# Patient Record
Sex: FEMALE | Race: WHITE | NOT HISPANIC OR LATINO | Employment: FULL TIME | URBAN - METROPOLITAN AREA
[De-identification: names, ages, dates, MRNs, and addresses within clinical notes are randomized per-mention and may not be internally consistent; named-entity substitution may affect disease eponyms.]

---

## 2018-08-15 ENCOUNTER — TRANSCRIBE ORDERS (OUTPATIENT)
Dept: ADMINISTRATIVE | Facility: HOSPITAL | Age: 38
End: 2018-08-15

## 2018-08-15 ENCOUNTER — APPOINTMENT (OUTPATIENT)
Dept: RADIOLOGY | Facility: MEDICAL CENTER | Age: 38
End: 2018-08-15
Payer: COMMERCIAL

## 2018-08-15 DIAGNOSIS — M54.6 THORACIC SPINE PAIN: ICD-10-CM

## 2018-08-15 DIAGNOSIS — M54.6 THORACIC SPINE PAIN: Primary | ICD-10-CM

## 2018-08-15 PROCEDURE — 72072 X-RAY EXAM THORAC SPINE 3VWS: CPT

## 2021-05-14 ENCOUNTER — TRANSCRIBE ORDERS (OUTPATIENT)
Dept: ADMINISTRATIVE | Facility: HOSPITAL | Age: 41
End: 2021-05-14

## 2021-05-14 ENCOUNTER — APPOINTMENT (OUTPATIENT)
Dept: RADIOLOGY | Facility: MEDICAL CENTER | Age: 41
End: 2021-05-14
Payer: COMMERCIAL

## 2021-05-14 DIAGNOSIS — M54.50 LOW BACK PAIN, UNSPECIFIED BACK PAIN LATERALITY, UNSPECIFIED CHRONICITY, UNSPECIFIED WHETHER SCIATICA PRESENT: Primary | ICD-10-CM

## 2021-05-14 DIAGNOSIS — M54.50 LOW BACK PAIN, UNSPECIFIED BACK PAIN LATERALITY, UNSPECIFIED CHRONICITY, UNSPECIFIED WHETHER SCIATICA PRESENT: ICD-10-CM

## 2021-05-14 PROCEDURE — 72110 X-RAY EXAM L-2 SPINE 4/>VWS: CPT

## 2021-12-07 ENCOUNTER — HOSPITAL ENCOUNTER (OUTPATIENT)
Dept: ULTRASOUND IMAGING | Facility: CLINIC | Age: 41
Discharge: HOME | End: 2021-12-07
Payer: COMMERCIAL

## 2021-12-07 ENCOUNTER — HOSPITAL ENCOUNTER (OUTPATIENT)
Dept: MAMMOGRAPHY | Facility: CLINIC | Age: 41
Discharge: HOME | End: 2021-12-07
Payer: COMMERCIAL

## 2021-12-07 VITALS — BODY MASS INDEX: 16.48 KG/M2 | HEIGHT: 67 IN | WEIGHT: 105 LBS

## 2021-12-07 DIAGNOSIS — N63.10 UNSPECIFIED LUMP IN THE RIGHT BREAST, UNSPECIFIED QUADRANT: ICD-10-CM

## 2021-12-07 DIAGNOSIS — N64.4 BREAST PAIN: ICD-10-CM

## 2021-12-07 PROCEDURE — 76642 ULTRASOUND BREAST LIMITED: CPT

## 2021-12-07 PROCEDURE — 77066 DX MAMMO INCL CAD BI: CPT

## 2021-12-07 PROCEDURE — G0279 TOMOSYNTHESIS, MAMMO: HCPCS

## 2022-12-31 ENCOUNTER — HOSPITAL ENCOUNTER (EMERGENCY)
Facility: HOSPITAL | Age: 42
Discharge: HOME | End: 2023-01-01
Attending: EMERGENCY MEDICINE

## 2022-12-31 DIAGNOSIS — F32.A DEPRESSION: ICD-10-CM

## 2022-12-31 DIAGNOSIS — Z00.8 ENCOUNTER FOR PSYCHOLOGICAL EVALUATION: Primary | ICD-10-CM

## 2022-12-31 DIAGNOSIS — F30.9 MANIA (HCC): ICD-10-CM

## 2022-12-31 LAB
AMPHETAMINES SERPL QL SCN: NEGATIVE
BARBITURATES UR QL: NEGATIVE
BENZODIAZ UR QL: NEGATIVE
COCAINE UR QL: POSITIVE
ETHANOL EXG-MCNC: 0 MG/DL
EXT PREGNANCY TEST URINE: NEGATIVE
EXT. CONTROL: NORMAL
FLUAV RNA RESP QL NAA+PROBE: NEGATIVE
FLUBV RNA RESP QL NAA+PROBE: NEGATIVE
METHADONE UR QL: NEGATIVE
OPIATES UR QL SCN: NEGATIVE
OXYCODONE+OXYMORPHONE UR QL SCN: NEGATIVE
PCP UR QL: NEGATIVE
RSV RNA RESP QL NAA+PROBE: NEGATIVE
SARS-COV-2 RNA RESP QL NAA+PROBE: NEGATIVE
THC UR QL: NEGATIVE

## 2022-12-31 RX ORDER — IBUPROFEN 400 MG/1
400 TABLET ORAL EVERY 8 HOURS PRN
Status: DISCONTINUED | OUTPATIENT
Start: 2022-12-31 | End: 2023-01-01 | Stop reason: HOSPADM

## 2022-12-31 RX ORDER — HYDROXYZINE HYDROCHLORIDE 25 MG/1
25 TABLET, FILM COATED ORAL EVERY 6 HOURS PRN
Status: DISCONTINUED | OUTPATIENT
Start: 2022-12-31 | End: 2023-01-01 | Stop reason: HOSPADM

## 2022-12-31 RX ORDER — ACETAMINOPHEN 325 MG/1
650 TABLET ORAL EVERY 8 HOURS PRN
Status: DISCONTINUED | OUTPATIENT
Start: 2022-12-31 | End: 2023-01-01 | Stop reason: HOSPADM

## 2022-12-31 RX ORDER — DIPHENHYDRAMINE HCL 25 MG
25 TABLET ORAL EVERY 6 HOURS PRN
Status: DISCONTINUED | OUTPATIENT
Start: 2022-12-31 | End: 2023-01-01 | Stop reason: HOSPADM

## 2022-12-31 RX ORDER — LANOLIN ALCOHOL/MO/W.PET/CERES
3 CREAM (GRAM) TOPICAL
Status: DISCONTINUED | OUTPATIENT
Start: 2022-12-31 | End: 2023-01-01 | Stop reason: HOSPADM

## 2022-12-31 RX ORDER — MAGNESIUM HYDROXIDE/ALUMINUM HYDROXICE/SIMETHICONE 120; 1200; 1200 MG/30ML; MG/30ML; MG/30ML
30 SUSPENSION ORAL EVERY 4 HOURS PRN
Status: DISCONTINUED | OUTPATIENT
Start: 2022-12-31 | End: 2023-01-01 | Stop reason: HOSPADM

## 2022-12-31 RX ORDER — NICOTINE 21 MG/24HR
21 PATCH, TRANSDERMAL 24 HOURS TRANSDERMAL DAILY
Status: DISCONTINUED | OUTPATIENT
Start: 2023-01-01 | End: 2023-01-01 | Stop reason: HOSPADM

## 2023-01-01 VITALS
TEMPERATURE: 98.2 F | RESPIRATION RATE: 18 BRPM | DIASTOLIC BLOOD PRESSURE: 81 MMHG | HEART RATE: 80 BPM | OXYGEN SATURATION: 99 % | SYSTOLIC BLOOD PRESSURE: 115 MMHG

## 2023-01-01 RX ORDER — IBUPROFEN 400 MG/1
400 TABLET ORAL ONCE
Status: DISCONTINUED | OUTPATIENT
Start: 2023-01-01 | End: 2023-01-01

## 2023-01-01 RX ORDER — BUPRENORPHINE AND NALOXONE 8; 2 MG/1; MG/1
8 FILM, SOLUBLE BUCCAL; SUBLINGUAL ONCE
Status: COMPLETED | OUTPATIENT
Start: 2023-01-01 | End: 2023-01-01

## 2023-01-01 RX ADMIN — Medication 21 MG: at 07:21

## 2023-01-01 RX ADMIN — BUPRENORPHINE AND NALOXONE 8 MG: 8; 2 FILM BUCCAL; SUBLINGUAL at 11:08

## 2023-01-01 RX ADMIN — IBUPROFEN 400 MG: 400 TABLET ORAL at 08:32

## 2023-01-01 RX ADMIN — HYDROXYZINE HYDROCHLORIDE 25 MG: 25 TABLET ORAL at 09:14

## 2023-01-01 RX ADMIN — ACETAMINOPHEN 650 MG: 325 TABLET, FILM COATED ORAL at 05:30
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
## 2023-01-01 NOTE — ED NOTES
Patient approached nursing station to ask how long it would be before she could sign out  Explained to patient that the provider had explained to her that the [facility] crisis 302 would be upheld at this time  Patient is asking to speak with someone because, "I don't want to hurt myself and I don't need to be here  I need to be at home and getting a job   "  Patient advised that the ED crisis worker would be over to speak with her as soon as all her lab tests resulted  Patient walked back to room stating, " This is just making me upset"       Jackson Jimenez, ELENA  12/31/22 2100

## 2023-01-01 NOTE — ED NOTES
Assumed care of pt at this time, pt ambulatory to bathroom with even and steady gait  Pt in no apparent distress        Shai Amado RN  01/01/23 2065

## 2023-01-01 NOTE — ED NOTES
Psych consult completed  Pt given phone to call significant other        Carolyn Maki RN  01/01/23 2005

## 2023-01-01 NOTE — ED NOTES
Pt reporting that she feels anxious, offered atarax and pt accepts  Pt stating that she wishes to go home and never was sincere that she wanted to harm herself  Crisis to speak with pt        Kathy Mccarthy RN  01/01/23 1551
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
## 2023-01-01 NOTE — ED NOTES
This writer discussed the patients current presentation and recommended discharge plan with Dr Smith Herrera psychiatrist recommends discharge for pt  They agree with the patient being discharged at this time with referrals and/or information about drug and outpatient therapy resources  The patient was Instructed to follow up with outpatient resources  The patient was provided with referral information for:   Drug counseling, therapist and outpatient  This writer and the patient completed a safety plan  The patient was provided with a copy of their safety plan with encouragement to utilize the plan following discharge  In addition, the patient was instructed to call [facility] crisis, other crisis services, [facility] or to go to the nearest ER immediately if their situation changes at any time  This writer discussed discharge plans with the patient, who agrees with and understands the discharge plans           SAFETY PLAN  Warning Signs (thoughts, images, mood, behavior, situations) of a potential crisis: increase suicidal thoughts, depression, anxiety    Coping Skills (what can I do to take my mind off the problem, or to keep myself safe): Return to ED, call sister    Outside Support (who can I reach out to for support and help): ER or hotline        [place] Suicide Prevention Hotline:  [address] [phone] - [address] [address]: [facility]: [address] [facility] [address] [place] [address] [address] [phone] - Crisis   [phone] - Peer Support Talk Line (1-9pm daily)  [phone] - [address] (1-9pm daily)  [address] [facility] [phone] - [address] [facility]
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
## 2023-01-01 NOTE — ED NOTES
Pt signed [phone] for inpatient mental health treatment  Original 201 on the paper chart at nurse's desk  Pt currently has no active insurance, [place] eligible  Pt will most likely need a  network bed  Currently no beds available in the  network

## 2023-01-01 NOTE — ED NOTES
Assumed care of patient at this time, Q15 minute checks in place  Patient is sleeping on the stretcher, respirations equal and unlabored, no outward signs of distress at this time        Gala Shore RN  01/01/23 0127

## 2023-01-01 NOTE — ED NOTES
302 not upheld due to patient signing  determination faxed to [facility] [place]

## 2023-01-01 NOTE — ED NOTES
Patient given gingerale at this time, but she declined any food        Domenic Richey RN  12/31/22 2114       Domenic Richey RN  12/31/22 2114

## 2023-01-01 NOTE — ED NOTES
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

## 2023-01-01 NOTE — ED NOTES
Crisis met with Pt per her request to update on placement  In addition, pt states she is in pain and in need of her suboxone  Which she has at home  Pt also requested phone call and is requesting to go home as she does not feel suicidal and has no intent to commit suicide

## 2023-01-01 NOTE — ED CARE HANDOFF
Emergency Department Sign Out Note        Sign out and transfer of care from [facility]  See Separate Emergency Department note  The patient, Jensen Call, was evaluated by the previous provider for psychiatric evaluation and a statement that she was going to kill her self with a razor blade       Workup Completed:  Crisis and labs    ED Course / Workup Pending (followup): I reviewed the PDMP and the patient does take Suboxone  It was listed for 3 times daily however she states she has not been taking it that way and may only take it once per day so I did give her a dose today as she is feeling shaky  The patient does not want to be here and does not feel she needs to be hospitalized  She did sign a 201 and I explained to her that we will need to get a psychiatric consult in order to evaluate her to see if inpatient hospitalization is necessary  Patient is agreeable to this  She is more calm and cooperative here  She was able to answer my questions appropriately  Denies SI or HI        11:45 AM: Discussed with psychiatrist on-call and he feels that she can be discharged and is not a threat to herself or anybody else at the present time  This was discussed with crisis  Will discharge  Procedures  Medical Decision Making  Depression: chronic illness or injury  Encounter for psychological evaluation: acute illness or injury  Amount and/or Complexity of Data Reviewed  Discussion of management or test interpretation with external provider(s): Discussion with crisis and psychiatrist on-call              Disposition  Final diagnoses:   Encounter for psychological evaluation   Depression   [facility])     Time reflects when diagnosis was documented in both MDM as applicable and the Disposition within this note     Time User Action Codes Description Comment    12/31/2022  9:48 PM Brandy Kitchen Add [Z00 8] Encounter for psychological evaluation     12/31/2022  9:48 PM Imtiaz Jennifer Juan Add [T08  A] Depression     12/31/2022  9:48 PM Imtiaz Jennifer Juan Add [F30 9] [facility])       ED Disposition     ED Disposition   Discharge    Condition   Stable    Date/Time   Sun Jan 1, 2023 11:46 AM    Comment   Sunshine Mera should be transferred out to D and has been medically cleared  MD Kimi Trammell   Sending MD Dr Rishi Saenz, DO      Follow-up Information     Follow up With Specialties Details Why Contact Info        Follow-up with your therapist/psychiatrist/discharge instructions per crisis        Patient's Medications    No medications on file     No discharge procedures on file         ED Provider  Electronically Signed by     Nilson Nolen MD  01/01/23 [address] [place] [address] [place], MD  01/01/23 1141

## 2023-01-01 NOTE — DISCHARGE INSTRUCTIONS
The patient was Instructed to follow up with outpatient resources  The patient was provided with referral information for:   Drug counseling, therapist and outpatient  This writer and the patient completed a safety plan  The patient was provided with a copy of their safety plan with encouragement to utilize the plan following discharge  In addition, the patient was instructed to call local [facility] crisis, other crisis services, [facility] or to go to the nearest ER immediately if their situation changes at any time  This writer discussed discharge plans with the patient, who agrees with and understands the discharge plans           SAFETY PLAN  Warning Signs (thoughts, images, mood, behavior, situations) of a potential crisis: increase suicidal thoughts, depression, anxiety    Coping Skills (what can I do to take my mind off the problem, or to keep myself safe): Return to ED, call sister    Outside Support (who can I reach out to for support and help): ER or hotline        National Suicide Prevention Hotline:  [address] 7-645-794-333-388-8664 - [facility] [place] [facility]: Critical access hospital: [place] [address] [phone] - Crisis   [phone] - Peer Support Talk Line (1-9pm daily)  [phone] - Teen Support Talk Line (1-9pm daily)  [address] [facility]) [phone] - [facility]

## 2023-01-01 NOTE — ED NOTES
Crisis spoke with Tania from [facility] [place] crisis and updated on Pt's disposition  Tania requested that original 302 be placed at ER registration desk for [place]  302 in sealed brown envelope @ registration for pickup by Tania

## 2023-01-01 NOTE — ED NOTES
Vital signs deferred at this time, patient is sleeping on the stretcher, respirations equal and unlabored        Yoandy Ruiz RN  01/01/23 7399

## 2023-06-08 ENCOUNTER — APPOINTMENT (EMERGENCY)
Dept: RADIOLOGY | Facility: HOSPITAL | Age: 43
End: 2023-06-08

## 2023-06-08 ENCOUNTER — APPOINTMENT (EMERGENCY)
Dept: CT IMAGING | Facility: HOSPITAL | Age: 43
End: 2023-06-08

## 2023-06-08 ENCOUNTER — HOSPITAL ENCOUNTER (EMERGENCY)
Facility: HOSPITAL | Age: 43
Discharge: HOME | End: 2023-06-08
Attending: EMERGENCY MEDICINE | Admitting: EMERGENCY MEDICINE

## 2023-06-08 VITALS
OXYGEN SATURATION: 100 % | DIASTOLIC BLOOD PRESSURE: 74 MMHG | WEIGHT: 114.42 LBS | RESPIRATION RATE: 20 BRPM | BODY MASS INDEX: 17.92 KG/M2 | SYSTOLIC BLOOD PRESSURE: 121 MMHG | HEART RATE: 74 BPM

## 2023-06-08 DIAGNOSIS — R07.9 CHEST PAIN: Primary | ICD-10-CM

## 2023-06-08 DIAGNOSIS — F41.9 ANXIETY: ICD-10-CM

## 2023-06-08 DIAGNOSIS — F99 PSYCHIATRIC SYMPTOMS: ICD-10-CM

## 2023-06-08 DIAGNOSIS — R44.0 AUDITORY HALLUCINATION: ICD-10-CM

## 2023-06-08 LAB
ALBUMIN SERPL BCP-MCNC: 4.1 G/DL (ref 3.5–5)
ALP SERPL-CCNC: 54 U/L (ref 34–104)
ALT SERPL W P-5'-P-CCNC: 10 U/L (ref 7–52)
AMPHETAMINES SERPL QL SCN: POSITIVE
ANION GAP SERPL CALCULATED.3IONS-SCNC: 5 MMOL/L (ref 4–13)
AST SERPL W P-5'-P-CCNC: 16 U/L (ref 13–39)
ATRIAL RATE: 70 BPM
BARBITURATES UR QL: NEGATIVE
BASOPHILS # BLD AUTO: 0.05 THOUSANDS/ÂΜL (ref 0–0.1)
BASOPHILS NFR BLD AUTO: 1 % (ref 0–1)
BENZODIAZ UR QL: NEGATIVE
BILIRUB SERPL-MCNC: 0.16 MG/DL (ref 0.2–1)
BILIRUB UR QL STRIP: NEGATIVE
BUN SERPL-MCNC: 16 MG/DL (ref 5–25)
CALCIUM SERPL-MCNC: 8.7 MG/DL (ref 8.4–10.2)
CARDIAC TROPONIN I PNL SERPL HS: 2 NG/L
CHLORIDE SERPL-SCNC: 105 MMOL/L (ref 96–108)
CLARITY UR: CLEAR
CO2 SERPL-SCNC: 30 MMOL/L (ref 21–32)
COCAINE UR QL: NEGATIVE
COLOR UR: NORMAL
CREAT SERPL-MCNC: 0.58 MG/DL (ref 0.6–1.3)
EOSINOPHIL # BLD AUTO: 0.36 THOUSAND/ÂΜL (ref 0–0.61)
EOSINOPHIL NFR BLD AUTO: 8 % (ref 0–6)
ERYTHROCYTE [DISTWIDTH] IN BLOOD BY AUTOMATED COUNT: 11.9 % (ref 11.6–15.1)
EXT PREGNANCY TEST URINE: NEGATIVE
EXT. CONTROL: NORMAL
GFR SERPL CREATININE-BSD FRML MDRD: 112 ML/MIN/1.73SQ M
GLUCOSE SERPL-MCNC: 103 MG/DL (ref 65–140)
GLUCOSE UR STRIP-MCNC: NEGATIVE MG/DL
HCT VFR BLD AUTO: 37.9 % (ref 34.8–46.1)
HGB BLD-MCNC: 12 G/DL (ref 11.5–15.4)
HGB UR QL STRIP.AUTO: NEGATIVE
IMM GRANULOCYTES # BLD AUTO: 0.02 THOUSAND/UL (ref 0–0.2)
IMM GRANULOCYTES NFR BLD AUTO: 0 % (ref 0–2)
KETONES UR STRIP-MCNC: NEGATIVE MG/DL
LEUKOCYTE ESTERASE UR QL STRIP: NEGATIVE
LIPASE SERPL-CCNC: 9 U/L (ref 11–82)
LYMPHOCYTES # BLD AUTO: 1.97 THOUSANDS/ÂΜL (ref 0.6–4.47)
LYMPHOCYTES NFR BLD AUTO: 41 % (ref 14–44)
MAGNESIUM SERPL-MCNC: 2 MG/DL (ref 1.9–2.7)
MCH RBC QN AUTO: 30.2 PG (ref 26.8–34.3)
MCHC RBC AUTO-ENTMCNC: 31.7 G/DL (ref 31.4–37.4)
MCV RBC AUTO: 96 FL (ref 82–98)
METHADONE UR QL: NEGATIVE
MONOCYTES # BLD AUTO: 0.28 THOUSAND/ÂΜL (ref 0.17–1.22)
MONOCYTES NFR BLD AUTO: 6 % (ref 4–12)
NEUTROPHILS # BLD AUTO: 2.14 THOUSANDS/ÂΜL (ref 1.85–7.62)
NEUTS SEG NFR BLD AUTO: 44 % (ref 43–75)
NITRITE UR QL STRIP: NEGATIVE
NRBC BLD AUTO-RTO: 0 /100 WBCS
OPIATES UR QL SCN: NEGATIVE
OXYCODONE+OXYMORPHONE UR QL SCN: NEGATIVE
P AXIS: 52 DEGREES
PCP UR QL: NEGATIVE
PH UR STRIP.AUTO: 6.5 [PH]
PLATELET # BLD AUTO: 194 THOUSANDS/UL (ref 149–390)
PMV BLD AUTO: 11.6 FL (ref 8.9–12.7)
POTASSIUM SERPL-SCNC: 3.8 MMOL/L (ref 3.5–5.3)
PR INTERVAL: 132 MS
PROT SERPL-MCNC: 6.4 G/DL (ref 6.4–8.4)
PROT UR STRIP-MCNC: NEGATIVE MG/DL
QRS AXIS: 73 DEGREES
QRSD INTERVAL: 92 MS
QT INTERVAL: 400 MS
QTC INTERVAL: 432 MS
RBC # BLD AUTO: 3.97 MILLION/UL (ref 3.81–5.12)
SODIUM SERPL-SCNC: 140 MMOL/L (ref 135–147)
SP GR UR STRIP.AUTO: 1.02 (ref 1–1.03)
T WAVE AXIS: 45 DEGREES
THC UR QL: NEGATIVE
TSH SERPL DL<=0.05 MIU/L-ACNC: 2.87 UIU/ML (ref 0.45–4.5)
UROBILINOGEN UR STRIP-ACNC: <2 MG/DL
VENTRICULAR RATE: 70 BPM
WBC # BLD AUTO: 4.82 THOUSAND/UL (ref 4.31–10.16)

## 2023-06-08 PROCEDURE — 80053 COMPREHEN METABOLIC PANEL: CPT | Performed by: EMERGENCY MEDICINE

## 2023-06-08 PROCEDURE — 83735 ASSAY OF MAGNESIUM: CPT | Performed by: EMERGENCY MEDICINE

## 2023-06-08 PROCEDURE — 93010 ELECTROCARDIOGRAM REPORT: CPT

## 2023-06-08 PROCEDURE — 99285 EMERGENCY DEPT VISIT HI MDM: CPT

## 2023-06-08 PROCEDURE — 84484 ASSAY OF TROPONIN QUANT: CPT | Performed by: EMERGENCY MEDICINE

## 2023-06-08 PROCEDURE — 36415 COLL VENOUS BLD VENIPUNCTURE: CPT | Performed by: EMERGENCY MEDICINE

## 2023-06-08 PROCEDURE — 81003 URINALYSIS AUTO W/O SCOPE: CPT | Performed by: EMERGENCY MEDICINE

## 2023-06-08 PROCEDURE — 81025 URINE PREGNANCY TEST: CPT | Performed by: EMERGENCY MEDICINE

## 2023-06-08 PROCEDURE — 96365 THER/PROPH/DIAG IV INF INIT: CPT

## 2023-06-08 PROCEDURE — 83690 ASSAY OF LIPASE: CPT | Performed by: EMERGENCY MEDICINE

## 2023-06-08 PROCEDURE — 84443 ASSAY THYROID STIM HORMONE: CPT | Performed by: EMERGENCY MEDICINE

## 2023-06-08 PROCEDURE — G1004 CDSM NDSC: HCPCS

## 2023-06-08 PROCEDURE — 96366 THER/PROPH/DIAG IV INF ADDON: CPT

## 2023-06-08 PROCEDURE — 70450 CT HEAD/BRAIN W/O DYE: CPT

## 2023-06-08 PROCEDURE — 85025 COMPLETE CBC W/AUTO DIFF WBC: CPT | Performed by: EMERGENCY MEDICINE

## 2023-06-08 PROCEDURE — 80307 DRUG TEST PRSMV CHEM ANLYZR: CPT | Performed by: EMERGENCY MEDICINE

## 2023-06-08 PROCEDURE — 93005 ELECTROCARDIOGRAM TRACING: CPT

## 2023-06-08 PROCEDURE — 71046 X-RAY EXAM CHEST 2 VIEWS: CPT

## 2023-06-08 RX ORDER — HYDROXYZINE HYDROCHLORIDE 25 MG/1
25 TABLET, FILM COATED ORAL EVERY 6 HOURS PRN
Qty: 12 TABLET | Refills: 0 | Status: SHIPPED | OUTPATIENT
Start: 2023-06-08 | End: 2023-06-11

## 2023-06-08 RX ORDER — HYDROXYZINE HYDROCHLORIDE 25 MG/1
25 TABLET, FILM COATED ORAL ONCE
Status: COMPLETED | OUTPATIENT
Start: 2023-06-08 | End: 2023-06-08

## 2023-06-08 RX ADMIN — SODIUM CHLORIDE, SODIUM LACTATE, POTASSIUM CHLORIDE, AND CALCIUM CHLORIDE 1000 ML: .6; .31; .03; .02 INJECTION, SOLUTION INTRAVENOUS at 16:37

## 2023-06-08 RX ADMIN — HYDROXYZINE HYDROCHLORIDE 25 MG: 25 TABLET, FILM COATED ORAL at 17:01

## 2023-06-08 NOTE — ED PROVIDER NOTES
"History  Chief Complaint   Patient presents with   • Medical Problem     Pt reports having several stressors in her life at this time, including going through a divorce, and states she is having a headache due to consistently hearing voices in her head that keep putting her down  Pt reports intermittent chest pressure that is a three as well as swollen lymph nodes under her arms for the past year  Pt reports decrease in sleep and decrease in weight  Patient is a 47-year-old female coming in today complaining of \"I just have a lot of things going on\"  Patient states that for the past several weeks to months she has been having intermittent chest pain  She states that the chest pain started several weeks ago  She describes as a pressure without any radiation to the neck, jaw, arm, back pain  She denies any fevers, chills, shortness of breath, palpitations or syncope  She has no family history of early coronary disease or ACS  she has no self history of arrhythmias  No history of PE or DVT  She also reports that she is having lymph nodes in her arms that swell and cause pain intermittently over the past several weeks  She has not seen a physician about this  She has no fevers, night sweats, hemoptysis  She denies any masses or swelling of her breasts no nipple discharge    Patient also reports that she has been hearing voices  She states that the voices started several weeks ago  They are not command in nature and are new for her  She denies any SI or HI  She denies any visual hallucinations  She states that \"when I hear the voices is just repeating what I am talking to myself  I talk to myself a lot  \" She denies ever seeing a psychiatrist or therapist in the past   She reports that she is going through a nasty divorce for the year  She now lives with her boyfriend and her   She feels safe at home  She does report that she is undergoing \"identity theft\"    Is been going on for a year " she states and that she has not gone to her local police station      In chart review, patient was seen by myself  Patient was brought in EMS at that time and is hyperventilating  Patient did sign a 201 at that time  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  According to her family she was making statements that she was getting kill himself with a razor      History provided by:  Patient and medical records   used: No    Chest Pain  Pain location:  Substernal area  Pain quality: dull and pressure    Pain radiates to:  Does not radiate  Pain radiates to the back: no    Pain severity:  Mild  Onset quality:  Gradual  Timing:  Intermittent  Progression:  Waxing and waning  Chronicity:  New  Context: at rest    Relieved by:  Nothing  Worsened by:  Nothing tried  Ineffective treatments:  None tried  Associated symptoms: anxiety    Associated symptoms: no abdominal pain, no back pain, no cough, no fever, no palpitations, no shortness of breath and not vomiting        None       Past Medical History:   Diagnosis Date   • Anxiety        Past Surgical History:   Procedure Laterality Date   • AUGMENTATION MAMMAPLASTY Bilateral 2014 1st-2000 w/ 3 replacements       Family History   Problem Relation Age of Onset   • Cervical cancer Mother 45   • No Known Problems Father    • No Known Problems Sister    • No Known Problems Maternal Grandmother    • No Known Problems Maternal Grandfather    • No Known Problems Paternal Grandmother    • No Known Problems Paternal Grandfather    • No Known Problems Maternal Aunt    • No Known Problems Paternal Aunt    • No Known Problems Paternal Aunt    • No Known Problems Paternal Aunt    • No Known Problems Paternal Aunt    • Colon cancer Paternal Aunt      I have reviewed and agree with the history as documented      E-Cigarette/Vaping   • E-Cigarette Use Current Every Day User      E-Cigarette/Vaping Substances   • Nicotine Yes    • THC No    • CBD No    • Flavoring No    • Other No    • Unknown No      Social History     Tobacco Use   • Smoking status: Every Day     Packs/day: 0 50     Types: Cigarettes   • Smokeless tobacco: Never   Vaping Use   • Vaping Use: Every day   • Substances: Nicotine   Substance Use Topics   • Alcohol use: Not Currently   • Drug use: Yes     Types: Cocaine       Review of Systems   Constitutional: Negative for chills and fever  HENT: Negative for ear pain and sore throat  Eyes: Negative for pain and visual disturbance  Respiratory: Negative for cough and shortness of breath  Cardiovascular: Positive for chest pain  Negative for palpitations  Gastrointestinal: Negative for abdominal pain and vomiting  Genitourinary: Negative for dysuria and hematuria  Musculoskeletal: Negative for arthralgias and back pain  Skin: Negative for color change and rash  Neurological: Negative for seizures and syncope  All other systems reviewed and are negative  Physical Exam  Physical Exam  Vitals and nursing note reviewed  Constitutional:       General: She is not in acute distress  Appearance: She is well-developed  HENT:      Head: Normocephalic and atraumatic  Comments: Patient maintaining airway and secretions  No stridor   No brawniness under tongue  Mouth/Throat:      Mouth: Mucous membranes are dry  Eyes:      Extraocular Movements: Extraocular movements intact  Conjunctiva/sclera: Conjunctivae normal       Pupils: Pupils are equal, round, and reactive to light  Cardiovascular:      Rate and Rhythm: Regular rhythm  Tachycardia present  Heart sounds: No murmur heard  Pulmonary:      Effort: Pulmonary effort is normal  No respiratory distress  Breath sounds: Normal breath sounds  Abdominal:      Palpations: Abdomen is soft  Tenderness: There is no abdominal tenderness  Musculoskeletal:         General: No swelling        Cervical back: Neck supple  Skin:     General: Skin is warm and dry  Capillary Refill: Capillary refill takes less than 2 seconds  Neurological:      General: No focal deficit present  Mental Status: She is alert and oriented to person, place, and time  GCS: GCS eye subscore is 4  GCS verbal subscore is 5  GCS motor subscore is 6  Cranial Nerves: Cranial nerves 2-12 are intact  Sensory: Sensation is intact  Motor: Motor function is intact  Coordination: Coordination is intact  Comments: No slurred speech  No facial asymmetry  No tongue deviation  Psychiatric:         Mood and Affect: Mood is anxious  Affect is flat  Behavior: Behavior normal  Behavior is cooperative  Thought Content:  Thought content normal          Judgment: Judgment normal          Vital Signs  ED Triage Vitals [06/08/23 1556]   Temp Pulse Respirations Blood Pressure SpO2   -- (!) 116 20 158/79 100 %      Temp src Heart Rate Source Patient Position - Orthostatic VS BP Location FiO2 (%)   -- Monitor Sitting Right arm --      Pain Score       3           Vitals:    06/08/23 1556 06/08/23 1746 06/08/23 1912   BP: 158/79 123/79 121/74   Pulse: (!) 116 70 74   Patient Position - Orthostatic VS: Sitting Lying Sitting         Visual Acuity      ED Medications  Medications   lactated ringers bolus 1,000 mL (0 mL Intravenous Stopped 6/8/23 1909)   hydrOXYzine HCL (ATARAX) tablet 25 mg (25 mg Oral Given 6/8/23 1701)       Diagnostic Studies  Results Reviewed     Procedure Component Value Units Date/Time    Rapid drug screen, urine [[id number]]  (Abnormal) Collected: 06/08/23 1654    Lab Status: Final result Specimen: Urine, Clean Catch Updated: 06/08/23 1824     Amph/Meth UR Positive     Barbiturate Ur Negative     Benzodiazepine Urine Negative     Cocaine Urine Negative     Methadone Urine Negative     Opiate Urine Negative     PCP Ur Negative     THC Urine Negative     Oxycodone Urine Negative    Narrative: FOR MEDICAL PURPOSES ONLY  IF CONFIRMATION NEEDED PLEASE CONTACT THE LAB WITHIN 5 DAYS      Drug Screen Cutoff Levels:  AMPHETAMINE/METHAMPHETAMINES  1000 ng/mL  BARBITURATES     200 ng/mL  BENZODIAZEPINES     200 ng/mL  COCAINE      300 ng/mL  METHADONE      300 ng/mL  OPIATES      300 ng/mL  PHENCYCLIDINE     25 ng/mL  THC       50 ng/mL  OXYCODONE      100 ng/mL    CBC and differential [[id number]]  (Abnormal) Collected: 06/08/23 1638    Lab Status: Final result Specimen: Blood from Arm, Right Updated: 06/08/23 1814     WBC 4 82 Thousand/uL      RBC 3 97 Million/uL      Hemoglobin 12 0 g/dL      Hematocrit 37 9 %      MCV 96 fL      MCH 30 2 pg      MCHC 31 7 g/dL      RDW 11 9 %      MPV 11 6 fL      Platelets 220 Thousands/uL      nRBC 0 /100 WBCs      Neutrophils Relative 44 %      Immat GRANS % 0 %      Lymphocytes Relative 41 %      Monocytes Relative 6 %      Eosinophils Relative 8 %      Basophils Relative 1 %      Neutrophils Absolute 2 14 Thousands/µL      Immature Grans Absolute 0 02 Thousand/uL      Lymphocytes Absolute 1 97 Thousands/µL      Monocytes Absolute 0 28 Thousand/µL      Eosinophils Absolute 0 36 Thousand/µL      Basophils Absolute 0 05 Thousands/µL     UA (URINE) with reflex to Scope [[id number]] Collected: 06/08/23 1654    Lab Status: Final result Specimen: Urine, Clean Catch Updated: 06/08/23 1807     Color, UA Light Yellow     Clarity, UA Clear     Specific [place], UA 1 020     pH, UA 6 5     Leukocytes, UA Negative     Nitrite, UA Negative     Protein, UA Negative mg/dl      Glucose, UA Negative mg/dl      Ketones, UA Negative mg/dl      Urobilinogen, UA <2 0 mg/dl      Bilirubin, UA Negative     Occult Blood, UA Negative    TSH [[id number]]  (Normal) Collected: 06/08/23 1638    Lab Status: Final result Specimen: Blood from Arm, Right Updated: 06/08/23 1738     TSH 3RD GENERATON 2 865 uIU/mL     Comprehensive metabolic panel [[id number]]  (Abnormal) Collected: 06/08/23 1638    Lab Status: Final result Specimen: Blood from Arm, Right Updated: 06/08/23 1734     Sodium 140 mmol/L      Potassium 3 8 mmol/L      Chloride 105 mmol/L      CO2 30 mmol/L      ANION GAP 5 mmol/L      BUN 16 mg/dL      Creatinine 0 58 mg/dL      Glucose 103 mg/dL      Calcium 8 7 mg/dL      AST 16 U/L      ALT 10 U/L      Alkaline Phosphatase 54 U/L      Total Protein 6 4 g/dL      Albumin 4 1 g/dL      Total Bilirubin 0 16 mg/dL      eGFR 112 ml/min/1 73sq m     Narrative:      [place] guidelines for Chronic Kidney Disease (CKD):   •  Stage 1 with normal or high GFR (GFR > 90 mL/min/1 73 square meters)  •  Stage 2 Mild CKD (GFR = 60-89 mL/min/1 73 square meters)  •  Stage 3A Moderate CKD (GFR = 45-59 mL/min/1 73 square meters)  •  Stage 3B Moderate CKD (GFR = 30-44 mL/min/1 73 square meters)  •  Stage 4 Severe CKD (GFR = 15-29 mL/min/1 73 square meters)  •  Stage 5 End Stage CKD (GFR <15 mL/min/1 73 square meters)  Note: GFR calculation is accurate only with a steady state creatinine    Magnesium [[id number]]  (Normal) Collected: 06/08/23 1638    Lab Status: Final result Specimen: Blood from Arm, Right Updated: 06/08/23 1734     Magnesium 2 0 mg/dL     Lipase [[id number]]  (Abnormal) Collected: 06/08/23 1638    Lab Status: Final result Specimen: Blood from Arm, Right Updated: 06/08/23 1734     Lipase 9 u/L     HS Troponin 0hr (reflex protocol) [[id number]]  (Normal) Collected: 06/08/23 1638    Lab Status: Final result Specimen: Blood from Arm, Right Updated: 06/08/23 1729     hs TnI 0hr 2 ng/L     POCT pregnancy, urine [[id number]]  (Normal) Resulted: 06/08/23 1703    Lab Status: Final result Updated: 06/08/23 1703     EXT Preg Test, Ur Negative     Control Valid                 XR chest 2 views   ED Interpretation by Lj Lynne DO (06/08 1714)   Large lung volumes  No free air  No focal consolidation    Cardiac silhouette stable      Final Result by Jami Haque MD (06/08 1853)      No "acute cardiopulmonary disease  Workstation performed: [id number]         CT head without contrast   Final Result by Jossie Leija MD (06/08 1659)      No acute intracranial abnormality  Workstation performed: [id number]                    Procedures  Procedures         ED Course  ED Course as of 06/08/23 2028   Thu Jun 08, 2023   1626 Patient is a 47-year-old female coming in today complaining of chest pain, lymph node swelling and overall just anxious and hearing voices  On exam she is tachycardic and appears anxious however denies any SI or HI  Met with patient  She declines any inpatient psychiatric help at this time does wish for outpatient resources we will start medical work-up    Disclosure: Voice to text software was used in the preparation of this document and could have resulted in translational errors       Occasional wrong word or \"sound a like\" substitutions may have occurred due to the inherent limitations of voice recognition software   Read the chart carefully and recognize, using context, where substitutions have occurred        I have independently reviewed external records are available to me to the level of detail possible within the time constraints of my patient care responsibilities in the ED        1654 No ischemia  No arrhythmia on EKG   1714 CT head and x-ray without acute pathology   1741 Patient and family updated  Patient feels better after Atarax  Discussed with patient and offered this for home which she is agreeable  Patient is aware of CT without acute pathology  Troponin 2 with a heart score less than 3  Pending CBC and urine  Patient boyfriend had some questions which patient was agreeable he could ask  He was worried she needs to see a psychiatrist  Discussed with him and patient at length need for follow up with psych and PCP  o   1818 CBC and urine clear  No evidence of acute pathology  1957 Patient and family had further questions    " They are asking for something stronger something like trazodone or benzo to sleep  Discussed with her that given that her lack of close follow-up at this point I do not feel is necessary and Atarax may help  They are asking to start other psychiatric medications  Again discussed with them there is important to follow-up with PCP         8:28 PM  Prior to discharge, called to room  Patient asking for trazodone or benzodiazepines     Discussed with her given her lack of close follow-up I do not feel as beneficial for her to start these  Patient is also on Suboxone  Patient then asked if we could start her on any psychiatric medications I discussed with her that given lack of close follow-up with a PCP or psychiatrist we do not start psychiatric medications here without further assistance  Patient continues to deny any SI or HI  Will DC home      HEART Risk Score    Flowsheet Row Most Recent Value   Heart Score Risk Calculator    History 0 Filed at: 06/08/2023 1742   ECG 1 Filed at: 06/08/2023 1742   Age 0 Filed at: 06/08/2023 1742   Risk Factors 1 Filed at: 06/08/2023 1742   Troponin 0 Filed at: 06/08/2023 1742   HEART Score 2 Filed at: 06/08/2023 1742                        SBIRT 20yo+    Flowsheet Row Most Recent Value   Initial Alcohol Screen: US AUDIT-C     1  How often do you have a drink containing alcohol? 0 Filed at: 06/08/2023 1718   2  How many drinks containing alcohol do you have on a typical day you are drinking? 0 Filed at: 06/08/2023 1718   3a  Male UNDER 65: How often do you have five or more drinks on one occasion? 0 Filed at: 06/08/2023 1718   3b  FEMALE Any Age, or MALE 65+: How often do you have 4 or more drinks on one occassion? 0 Filed at: 06/08/2023 1718   Audit-C Score 0 Filed at: 06/08/2023 1718   PENG: How many times in the past year have you    Used an illegal drug or used a prescription medication for non-medical reasons?  Never Filed at: 06/08/2023 1718                    Medical Decision Making      EKG INTERPRETATION  @ 1645  RHYTHM:NSR  @ 70bpm  AXIS: normal axis   INTERVALS: TX @ 132 ms  QRS COMPLEX: QRS @ 92 ms  ST SEGMENT: non specific st segment changes  Diffuse artifact   QT INTERVAL: QTC @ 432 ms  COMPARED WITH PRIOR no old  Interpretation by Talisha Vides DO    Different diagnosis : ACS, NSTEMI, pleurisry, pneumothorax, costochondritis, pneumonia, GERD, anxiety, hepatitis, pancreatitis, aortic dissection, pericarditis, myocarditis, pericardial effusion, asthma exacerbation, COPD exacerbation, herpes zoster, peritoneal rupture, esophageal spasm  Anxiety: chronic illness or injury  Auditory hallucination: acute illness or injury  Chest pain: acute illness or injury  Amount and/or Complexity of Data Reviewed  Independent Historian:      Details: Patient's boyfriend at bedside  External Data Reviewed: notes  Labs: ordered  Decision-making details documented in ED Course  Details: Urine pregnancy negative  Troponin 2  No evidence of endorgan damage  Radiology: ordered and independent interpretation performed  Decision-making details documented in ED Course  Details: CT head WNL  Chest x-ray on my read reveals no acute pathology  ECG/medicine tests: ordered and independent interpretation performed  Decision-making details documented in ED Course  Details: no ischemic changes  No arrhythmia      Risk  Prescription drug management            Disposition  Final diagnoses:   Chest pain   Psychiatric symptoms   Anxiety   Auditory hallucination     Time reflects when diagnosis was documented in both MDM as applicable and the Disposition within this note     Time User Action Codes Description Comment    6/8/2023  6:28 PM Peter Nan Add [R07 9] Chest pain     6/8/2023  6:28 PM Irine Nan Add [F99] Psychiatric symptoms     6/8/2023  6:29 PM Makennae Nan Add [F41 9] Anxiety     6/8/2023  6:30 PM Edmundo Kitchen Add [R44 0] Auditory hallucination       ED Disposition     ED Disposition   Discharge    Condition   Stable    Date/Time   Thu Jun 8, 2023  6:28 PM    Comment   [facility] discharge to home/self care                 MD Documentation    [employer] Most Recent Value   Sending MD Dr Olivier Camacho up With Specialties Details Why Contact Info Additional [address] [facility] Schedule an appointment as soon as possible for a visit in 3 days  [address], [address] [facility] [id number]  [address], [address], [address] [place], [place], [address]          Discharge Medication List as of 6/8/2023  6:30 PM      START taking these medications    Details   hydrOXYzine HCL (ATARAX) 25 mg tablet Take 1 tablet (25 mg total) by mouth every 6 (six) hours as needed for anxiety for up to 3 days, Starting Thu 6/8/2023, Until Sun 6/11/2023 at 2359, Normal                 PDMP Review     None          ED Provider  Electronically Signed by           Smith Feliciano,   06/08/23 1310 Bhargavi Teague,   06/08/23 2033

## 2023-06-08 NOTE — ED PROCEDURE NOTE
Procedure  POC AAA US    Date/Time: 6/8/2023 6:04 PM    Performed by: Magaly Levine MD  Authorized by: Magaly Levine MD    Patient location:  ED  Performing Provider:  Resident  Procedure details:     Exam Type:  Educational    Indications: abdominal pain      Views Obtained:  Transverse proximal and transverse mid view (Unable to view distal 2/2 bowel gas)    Image quality: limited diagnostic      Image availability:  Images available in PACS  Findings:     Abdominal Aorta Findings: limited visualization of infrarenal aorta      Maximal Aorta diameter (cm):  1 65    Intra-abdominal fluid: not identified    POC Biliary US    Date/Time: 6/8/2023 6:06 PM    Performed by: Magaly Levine MD  Authorized by: Magaly Levine MD    Patient location:  ED  Performed by:  Resident  Procedure details:     Exam Type:  Educational    Indications: epigastric pain      Assessment for:  Cholecystitis and cholelithiasis    Views obtained: gallbladder (transverse and longitudinal)      Image quality: limited diagnostic      Image availability:  Images available in PACS  Findings:     Cholelithiasis: not identified      Gallbladder wall thickness (mm):  1    Pericholecystic fluid: not identified      Sonographic Leon's sign: negative                       Magaly Levine MD  06/08/23 1801

## 2023-06-08 NOTE — ED NOTES
Patient presents to the Emergency Department in the care of her boyfriend with medical complaints and the new onset of auditory hallucinations  Patient is calm and cooperative upon approach, and agrees to speak with this writer  Patient is oriented across all spheres, is occasionally tangential but redirectable, and is in an appropriate mood  Patient reports several stressors impacting her daily life, including being entangled in a messy divorce, and a years long issue with identity theft  Patient reports over the past few weeks she has been experiencing auditory hallucinations that manifest as her voice repeating her inner dialogue several times  Patient denies that the voice is command in any way, just repetitive  Patient does not report that the voices increase at certain times of day or when she is more stressed than usual  Patient denies suicidal ideation, homicidal ideation and visual/tactile hallucinations  Patient denies drug, alcohol, tobacco consumption, but reports a history of prescription pill abuse for which she takes suboxone  Patient reports she feels safe at home  Patient open to receiving outpatient treatment provider resources  Patient does not feel as if she needs inpatient treatment at this time      Sherrill Medina  Crisis Intervention Specialist II  06/08/23

## 2023-06-08 NOTE — DISCHARGE INSTRUCTIONS
This writer discussed the patients current presentation and recommended discharge plan with Dr Gerald Clark  They agree with the patient being discharged at this time with referrals and/or information about outpatient treatment providers  The patient was Instructed to follow up with their PCP  The patient was provided with a blank safety plan with encouragement to utilize the plan following discharge should the need arise  In addition, the patient was instructed to call local [facility] crisis, other crisis services, [facility] or to go to the nearest ER immediately if their situation changes at any time  This writer discussed discharge plans with the patient, who agrees with and understands the discharge plans  SAFETY PLAN  Warning Signs (thoughts, images, mood, behavior, situations) of a potential crisis:       Coping Skills (what can I do to take my mind off the problem, or to keep myself safe):        Outside Support (who can I reach out to for support and help):         National Suicide Prevention Hotline:  [address]: [facility]: [address] [phone] - Crisis   [phone] - Peer [address] [facility] (1-9pm daily)  [phone] - Teen Support Talk Line (1-9pm daily)  [address] [facility]) [phone] - [address] [facility]

## 2023-06-12 ENCOUNTER — TELEPHONE (OUTPATIENT)
Dept: PSYCHIATRY | Facility: CLINIC | Age: 43
End: 2023-06-12

## 2023-06-12 NOTE — TELEPHONE ENCOUNTER
Contacted patient in regards to Routine Referral in attempts to verify patient's needs of services and add patient to proper wait list  Unable to  LVM for patient to contact intake dept  in regards to referral due to vm being full

## 2023-06-15 ENCOUNTER — OFFICE VISIT (OUTPATIENT)
Dept: FAMILY MEDICINE CLINIC | Facility: CLINIC | Age: 43
End: 2023-06-15

## 2023-06-15 VITALS
HEIGHT: 67 IN | RESPIRATION RATE: 18 BRPM | OXYGEN SATURATION: 99 % | BODY MASS INDEX: 17.96 KG/M2 | HEART RATE: 93 BPM | TEMPERATURE: 97.9 F | DIASTOLIC BLOOD PRESSURE: 62 MMHG | WEIGHT: 114.4 LBS | SYSTOLIC BLOOD PRESSURE: 112 MMHG

## 2023-06-15 DIAGNOSIS — Z76.89 ENCOUNTER TO ESTABLISH CARE: Primary | ICD-10-CM

## 2023-06-15 DIAGNOSIS — R63.6 UNDERWEIGHT: ICD-10-CM

## 2023-06-15 DIAGNOSIS — F41.9 ANXIETY: ICD-10-CM

## 2023-06-15 DIAGNOSIS — F51.01 PRIMARY INSOMNIA: ICD-10-CM

## 2023-06-15 DIAGNOSIS — R44.0 AUDITORY HALLUCINATIONS: ICD-10-CM

## 2023-06-15 DIAGNOSIS — Z12.4 CERVICAL CANCER SCREENING: ICD-10-CM

## 2023-06-15 DIAGNOSIS — Z72.0 TOBACCO USE: ICD-10-CM

## 2023-06-15 PROCEDURE — 99204 OFFICE O/P NEW MOD 45 MIN: CPT

## 2023-06-15 RX ORDER — TRAZODONE HYDROCHLORIDE 50 MG/1
50 TABLET ORAL
Qty: 60 TABLET | Refills: 0 | Status: SHIPPED | OUTPATIENT
Start: 2023-06-15 | End: 2023-07-06 | Stop reason: SDUPTHER

## 2023-06-15 RX ORDER — ARIPIPRAZOLE 2 MG/1
2 TABLET ORAL DAILY
Qty: 30 TABLET | Refills: 0 | Status: SHIPPED | OUTPATIENT
Start: 2023-06-15 | End: 2023-06-23

## 2023-06-15 RX ORDER — FLUOXETINE 10 MG/1
10 CAPSULE ORAL DAILY
Qty: 30 CAPSULE | Refills: 0 | Status: SHIPPED | OUTPATIENT
Start: 2023-06-15 | End: 2023-06-23
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
## 2023-06-22 ENCOUNTER — TELEPHONE (OUTPATIENT)
Dept: FAMILY MEDICINE CLINIC | Facility: CLINIC | Age: 43
End: 2023-06-22

## 2023-06-22 NOTE — TELEPHONE ENCOUNTER
first attempt to contact patient  no answer  Mail box is full couldn't leave a message  Patient has an appointment 6/23/23 with another office  She would have to call them and reschedule her appointment  Hey, my name is Yu Callahan  I have an appointment on the 23rd, but I need to schedule it for sooner  I was trying to get in with the site consult, but I'm not finding any places that even have an availability or take my insurance  So the doctor said if I had a problem with that, to come in to see her sooner or I thought maybe you guys could help with that  But essentially I need to move my appointment up  So if you give me a call back that would be great  Again, it's Yu Callahan, spelled Florestine Schirmer  And then the phone number is [phone], [phone]

## 2023-06-23 ENCOUNTER — OFFICE VISIT (OUTPATIENT)
Dept: FAMILY MEDICINE CLINIC | Facility: CLINIC | Age: 43
End: 2023-06-23

## 2023-06-23 VITALS
HEART RATE: 108 BPM | TEMPERATURE: 98.7 F | HEIGHT: 67 IN | BODY MASS INDEX: 18.21 KG/M2 | OXYGEN SATURATION: 99 % | DIASTOLIC BLOOD PRESSURE: 80 MMHG | WEIGHT: 116 LBS | SYSTOLIC BLOOD PRESSURE: 120 MMHG | RESPIRATION RATE: 16 BRPM

## 2023-06-23 DIAGNOSIS — R44.0 AUDITORY HALLUCINATIONS: ICD-10-CM

## 2023-06-23 DIAGNOSIS — Z59.41 FOOD INSECURITY: ICD-10-CM

## 2023-06-23 DIAGNOSIS — F41.9 ANXIETY: Primary | ICD-10-CM

## 2023-06-23 DIAGNOSIS — Z59.89 INSURANCE COVERAGE PROBLEMS: ICD-10-CM

## 2023-06-23 PROCEDURE — 99213 OFFICE O/P EST LOW 20 MIN: CPT | Performed by: FAMILY MEDICINE

## 2023-06-23 RX ORDER — ALPRAZOLAM 0.5 MG/1
0.5 TABLET ORAL 2 TIMES DAILY PRN
Qty: 20 TABLET | Refills: 0 | Status: SHIPPED | OUTPATIENT
Start: 2023-06-23 | End: 2023-07-06 | Stop reason: SDUPTHER

## 2023-06-23 RX ORDER — FLUOXETINE HYDROCHLORIDE 20 MG/1
20 CAPSULE ORAL DAILY
Qty: 30 CAPSULE | Refills: 0 | Status: SHIPPED | OUTPATIENT
Start: 2023-06-23 | End: 2023-07-24 | Stop reason: SDUPTHER

## 2023-06-23 RX ORDER — ARIPIPRAZOLE 5 MG/1
5 TABLET ORAL DAILY
Qty: 30 TABLET | Refills: 0 | Status: SHIPPED | OUTPATIENT
Start: 2023-06-23 | End: 2023-07-24 | Stop reason: SDUPTHER

## 2023-06-23 SDOH — ECONOMIC STABILITY - FOOD INSECURITY: FOOD INSECURITY: Z59.41

## 2023-06-23 SDOH — ECONOMIC STABILITY - INCOME SECURITY: OTHER PROBLEMS RELATED TO HOUSING AND ECONOMIC CIRCUMSTANCES: Z59.89

## 2023-06-23 NOTE — PATIENT INSTRUCTIONS
^^^^^^^^^^^^^^^^^^^^^^^^^^^  Your safety and wellbeing as well as that of those around you is important to us. Should you or someone you know be in need, here are some area resources that may help:    Do you feel like you might be in crisis and potentially need professional services immediately? National Suicide Prevention Lifeline: Dial #851  If you prefer to text, you can reach the Crisis Text Line by texting “PA” to 692501    ¿Te encuentras en crisis? Envía un mensaje de texto con la Denece Hint al 030618 para comunicarte de manera gratuita con un Consejero de Crisis  Apoyo gratuito las 24 horas del día, los 7 jo ann de la [place], al alcance de tu mano. Alternatively, you can Visit https://[employer]M [employer]/. pdf to find your Psychiatric hospital's 24/7 crisis phone line. Are you feeling overwhelmed, want to speak with a supportive person (NOT for crisis), and/or are you looking for additional resources? [facility] [facility] Warmline: Call (193) 125-5722. This Warmline provides telephone service for Adult residents (18 years and older) of [facility] who need emotional support, help with problem solving, or information and referral. (Hours 7 Days a week 6:00 AM and 2:00 AM)    [employer] Violence: Dial 6-949.289.4167 or Visit Mercy.gl  Among the services provided to domestic violence victims are: crisis intervention; counseling; accompaniment to police, medical, and court facilities; and temporary emergency shelter for victims and their dependent children. Prevention and educational programs are provided to lessen the risk of domestic violence in the community at large. Find a local Alcohol Anonymous meeting: Dial 7-502.615.6545 or Visit https://www. aaEuroling. com/rt-cjungqw-utstcqwy/[place]/  Find a local Narcotics Anonymous meeting: Visit Instapio.rashel Guillen is a website where you can look for accessible care and many other services including financial assistance, food pantries, medical care, and other free or reduced-cost resources in your area, even if you don't have insurance. Visit: https://FriendsEATcommunityhealth. Traveler | VIP.TinyCo/    Career link is your resource for all things job and employment related. [facility], [address] [facility], [address] [place] [address]  Walk in or call Monday through Friday 8:00AM - 4:30 PM: [phone]/[facility]: [phone]  www.careerlinklePortable InternetElkfork. org    If you need to connect with resources in your community, but don't know where to look,  is a great place to start. From help with a utilities bill, to housing assistance, after-school programs for kids, and more, you can dial 211 or text your zip code to [phone] to talk with a  for free. [address] [place] [address],[address] Referral Office is the entry point for [facility] and information about community resources. Professional Caseworkers will work with you to determination whether a referral requires further assessment and may either refer you to a community agency, or, complete a formal referral to a Human Services specialized office (Aging and Adult Services, Children and Youth, Mental Health, Developmental Programs or Early Intervention). [facility], [facility],[address] [place] [address]  Dial: [phone]  Walk-in hours are 8:30AM to 4:15PM, Monday through Friday-- no appointment is needed. If you feel at risk of immediate harm to yourself or another, call 9-1-1 or go directly to the Emergency Department.   ^^^^^^^^^^^^^^^^^^^^^^^^^^^^^^

## 2023-06-25 PROBLEM — Z72.0 TOBACCO USE: Status: ACTIVE | Noted: 2023-06-25

## 2023-06-25 PROBLEM — R63.6 UNDERWEIGHT: Status: ACTIVE | Noted: 2023-06-25

## 2023-06-25 PROBLEM — F51.01 PRIMARY INSOMNIA: Status: ACTIVE | Noted: 2023-06-25

## 2023-06-25 PROBLEM — R44.0 AUDITORY HALLUCINATIONS: Status: ACTIVE | Noted: 2023-06-25

## 2023-06-25 PROBLEM — F41.9 ANXIETY: Status: ACTIVE | Noted: 2023-06-25

## 2023-06-25 RX ORDER — BUPRENORPHINE HYDROCHLORIDE AND NALOXONE HYDROCHLORIDE DIHYDRATE 8; 2 MG/1; MG/1
TABLET SUBLINGUAL
COMMUNITY
Start: 2023-06-15

## 2023-06-25 NOTE — ASSESSMENT & PLAN NOTE
BMI Counseling: Body mass index is 17 92 kg/m²  The BMI is below normal  Patient advised to gain weight  Rationale for BMI follow-up plan is due to patient being underweight

## 2023-06-25 NOTE — ASSESSMENT & PLAN NOTE
Reports anxiety accompanied by/exacerbated by insomnia and auditory hallucinations  Numerous social/financial stressors  Also receiving Suboxone and Adderall through an online provider    - Pt requests benzodiazepines however due to reported history of addiction and current polypharmacy I explained that will not be prescribing benzodiazepines for this patient    - Start Prozac 10 mg HS along with Abilify 2 mg   - Continue hydroxyzine 25 mg PRN for anxiety which pt states was effective in past and has on hand  - Trazodone 50 mg, 1-2 tabs HS PRN for sleep  - Referred to Psychiatry Dr Demar Peralta per pt's request   - Also provided with list of local mental health providers   - Follow up in 4 weeks, sooner if symptoms worsen or change

## 2023-06-26 ENCOUNTER — OFFICE VISIT (OUTPATIENT)
Dept: OBGYN CLINIC | Facility: CLINIC | Age: 43
End: 2023-06-26

## 2023-06-26 VITALS
HEIGHT: 67 IN | BODY MASS INDEX: 18.02 KG/M2 | SYSTOLIC BLOOD PRESSURE: 93 MMHG | WEIGHT: 114.8 LBS | DIASTOLIC BLOOD PRESSURE: 61 MMHG | HEART RATE: 80 BPM

## 2023-06-26 DIAGNOSIS — N95.2 VAGINAL ATROPHY: Primary | ICD-10-CM

## 2023-06-26 PROCEDURE — 99202 OFFICE O/P NEW SF 15 MIN: CPT | Performed by: OBSTETRICS & GYNECOLOGY

## 2023-06-26 RX ORDER — ESTRADIOL 0.1 MG/G
1 CREAM VAGINAL DAILY
Qty: 42.5 G | Refills: 5 | Status: SHIPPED | OUTPATIENT
Start: 2023-06-26

## 2023-06-27 ENCOUNTER — TELEPHONE (OUTPATIENT)
Dept: FAMILY MEDICINE CLINIC | Facility: CLINIC | Age: 43
End: 2023-06-27

## 2023-06-27 NOTE — TELEPHONE ENCOUNTER
Hi my name is Nilda Claude  My YOB: 1980  I needed to see if I could schedule an appointment with Doctor Reuben Barr or Mikala Jacob  I was seeing Ambreen conner but I switched to Doctor Mikala Jacob and I need to get an appointment as soon as possible for physical  So if you can give me a call back that would be great  My phone number is [phone]  And again, it's Woo Horvath  Thank you  Patient wanted a physical with Dr Templeton but she's already scheduled with Miss Elijah Carballo  Also wanted her PCP changed to Dr Templeton  so she's going to keep her physical with Miss Elijah Carballo this time

## 2023-06-28 ENCOUNTER — TELEPHONE (OUTPATIENT)
Dept: FAMILY MEDICINE CLINIC | Facility: CLINIC | Age: 43
End: 2023-06-28

## 2023-07-05 ENCOUNTER — TELEPHONE (OUTPATIENT)
Dept: FAMILY MEDICINE CLINIC | Facility: CLINIC | Age: 43
End: 2023-07-05

## 2023-07-05 NOTE — TELEPHONE ENCOUNTER
Hi, my name is Lea Lombard. YOB: 1980. I have an appointment tomorrow on July 6th with [facility] at 1:25 that I need to cancel. My phone number is [phone]. I'll call back to reschedule. Thank you. Appointment cancelled.

## 2023-07-06 DIAGNOSIS — F51.01 PRIMARY INSOMNIA: ICD-10-CM

## 2023-07-06 DIAGNOSIS — R44.0 AUDITORY HALLUCINATIONS: ICD-10-CM

## 2023-07-06 DIAGNOSIS — F41.9 ANXIETY: ICD-10-CM

## 2023-07-07 RX ORDER — ALPRAZOLAM 0.5 MG/1
0.5 TABLET ORAL 2 TIMES DAILY PRN
Qty: 20 TABLET | Refills: 0 | Status: SHIPPED | OUTPATIENT
Start: 2023-07-07

## 2023-07-07 RX ORDER — TRAZODONE HYDROCHLORIDE 50 MG/1
50 TABLET ORAL
Qty: 60 TABLET | Refills: 0 | Status: SHIPPED | OUTPATIENT
Start: 2023-07-07

## 2023-07-11 ENCOUNTER — TELEPHONE (OUTPATIENT)
Dept: OBGYN CLINIC | Facility: CLINIC | Age: 43
End: 2023-07-11
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
## 2023-07-24 DIAGNOSIS — R44.0 AUDITORY HALLUCINATIONS: ICD-10-CM

## 2023-07-24 DIAGNOSIS — F41.9 ANXIETY: ICD-10-CM

## 2023-07-27 RX ORDER — ARIPIPRAZOLE 5 MG/1
5 TABLET ORAL DAILY
Qty: 30 TABLET | Refills: 0 | Status: SHIPPED | OUTPATIENT
Start: 2023-07-27

## 2023-07-27 RX ORDER — FLUOXETINE HYDROCHLORIDE 20 MG/1
20 CAPSULE ORAL DAILY
Qty: 30 CAPSULE | Refills: 0 | Status: SHIPPED | OUTPATIENT
Start: 2023-07-27

## 2023-07-28 DIAGNOSIS — R44.0 AUDITORY HALLUCINATIONS: ICD-10-CM

## 2023-07-28 DIAGNOSIS — F41.9 ANXIETY: ICD-10-CM

## 2023-08-01 RX ORDER — ALPRAZOLAM 0.5 MG/1
0.5 TABLET ORAL 2 TIMES DAILY PRN
Qty: 20 TABLET | Refills: 0 | Status: SHIPPED | OUTPATIENT
Start: 2023-08-01

## 2023-08-09 ENCOUNTER — OFFICE VISIT (OUTPATIENT)
Dept: FAMILY MEDICINE CLINIC | Facility: CLINIC | Age: 43
End: 2023-08-09

## 2023-08-09 VITALS
DIASTOLIC BLOOD PRESSURE: 60 MMHG | SYSTOLIC BLOOD PRESSURE: 102 MMHG | RESPIRATION RATE: 17 BRPM | WEIGHT: 114 LBS | HEART RATE: 111 BPM | HEIGHT: 67 IN | OXYGEN SATURATION: 99 % | BODY MASS INDEX: 17.89 KG/M2 | TEMPERATURE: 98 F

## 2023-08-09 DIAGNOSIS — F51.01 PRIMARY INSOMNIA: ICD-10-CM

## 2023-08-09 DIAGNOSIS — F41.9 ANXIETY: ICD-10-CM

## 2023-08-09 DIAGNOSIS — R44.0 AUDITORY HALLUCINATIONS: ICD-10-CM

## 2023-08-09 PROCEDURE — 99213 OFFICE O/P EST LOW 20 MIN: CPT | Performed by: FAMILY MEDICINE

## 2023-08-09 RX ORDER — FLUOXETINE 10 MG/1
30 CAPSULE ORAL DAILY
Qty: 90 CAPSULE | Refills: 0 | Status: SHIPPED | OUTPATIENT
Start: 2023-08-09 | End: 2023-09-08

## 2023-08-09 RX ORDER — TRAZODONE HYDROCHLORIDE 50 MG/1
50 TABLET ORAL
Qty: 30 TABLET | Refills: 1 | Status: SHIPPED | OUTPATIENT
Start: 2023-08-09

## 2023-08-09 NOTE — PROGRESS NOTES
Name: Marcela Grace      : 1980      MRN: [id number]  Encounter Provider: Jeff Guerra MD  Encounter Date: 2023   Encounter department: [address]     1. Anxiety  Assessment & Plan:  Improving symptoms  Increase Prozac to 30 mg daily  Continue trazodone 50 mg at night  Use Xanax 0.5 mg twice daily as needed  Follow-up with mental health office  Brief counseling and support given    Orders:  -     FLUoxetine (PROzac) 10 mg capsule; Take 3 capsules (30 mg total) by mouth daily    2. Auditory hallucinations  -     FLUoxetine (PROzac) 10 mg capsule; Take 3 capsules (30 mg total) by mouth daily    3. Primary insomnia  -     traZODone (DESYREL) 50 mg tablet; Take 1 tablet (50 mg total) by mouth daily at bedtime           Subjective      Marcela Grace is a 38 y.o. female  has a past medical history of Anxiety who presented to the office today for follow-up. Pt is feeling better overall, but still + anxiety and  insomnia, the auditory hallucinations are better but still present. She is starting to eat more regularly but still has trouble with sleep. 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 She states she starts to shake at work and it makes her feel " on the spot and uncomfortable."  She has moved back home and has a better relationship with her  and was missing her dogs. She she is borrowing her boyfriend's truck to go to work, and has received to tickets due to lack of inspection of the car. This has increased her anxiety, but she is trying to deal with 1 issue at a time. Review of Systems   Constitutional: Positive for fatigue. Negative for chills and fever. HENT: Negative for congestion, rhinorrhea and sore throat. Respiratory: Negative for cough and shortness of breath. Cardiovascular: Negative for chest pain. Gastrointestinal: Negative for diarrhea, nausea and vomiting. Skin: Negative for rash. Neurological: Negative for dizziness and headaches. Psychiatric/Behavioral: Positive for sleep disturbance. The patient is nervous/anxious. Current Outpatient Medications on File Prior to Visit   Medication Sig   • ALPRAZolam (XANAX) 0.5 mg tablet Take 1 tablet (0.5 mg total) by mouth 2 (two) times a day as needed for anxiety   • ARIPiprazole (ABILIFY) 5 mg tablet Take 1 tablet (5 mg total) by mouth daily   • buprenorphine-naloxone (SUBOXONE) 8-2 mg per SL tablet DISSOLVE 1 TABLET UNDER THE TONGUE THREE TIMES DAILY   • estradiol (ESTRACE VAGINAL) 0.1 mg/g vaginal cream Insert 1 g into the vagina daily Nightly per vagina x 2 weeks, Then 2 times a week (such as Monday/Thursday)   • hydrOXYzine HCL (ATARAX) 25 mg tablet Take 1 tablet (25 mg total) by mouth every 6 (six) hours as needed for anxiety for up to 3 days       Objective     /60 (BP Location: Right arm, Patient Position: Sitting, Cuff Size: Standard)   Pulse (!) 111   Temp 98 °F (36.7 °C) (Temporal)   Resp 17   Ht 5' 7" (1.702 m)   Wt 51.7 kg (114 lb)   SpO2 99%   BMI 17.85 kg/m²     Physical Exam  Vitals and nursing note reviewed. Constitutional:       Appearance: She is well-developed. HENT:      Head: Normocephalic and atraumatic. Cardiovascular:      Rate and Rhythm: Tachycardia present. Pulmonary:      Effort: Pulmonary effort is normal. No respiratory distress. Neurological:      Mental Status: She is alert and oriented to person, place, and time. Psychiatric:         Attention and Perception: Attention normal.         Mood and Affect: Mood is anxious. Affect is tearful. Speech: Speech normal.         Behavior: Behavior normal. Behavior is cooperative. Thought Content: Thought content does not include homicidal or suicidal plan.          Cognition and Memory: Cognition normal.       Sandie Verdugo MD

## 2023-08-14 NOTE — ASSESSMENT & PLAN NOTE
Improving symptoms  Increase Prozac to 30 mg daily  Continue trazodone 50 mg at night  Use Xanax 0.5 mg twice daily as needed  Follow-up with mental health office  Brief counseling and support given

## 2023-08-24 DIAGNOSIS — F41.9 ANXIETY: ICD-10-CM

## 2023-08-24 DIAGNOSIS — R44.0 AUDITORY HALLUCINATIONS: ICD-10-CM

## 2023-08-25 RX ORDER — ALPRAZOLAM 0.5 MG/1
0.5 TABLET ORAL 2 TIMES DAILY PRN
Qty: 20 TABLET | Refills: 0 | Status: SHIPPED | OUTPATIENT
Start: 2023-08-25

## 2023-08-25 RX ORDER — ARIPIPRAZOLE 5 MG/1
5 TABLET ORAL DAILY
Qty: 30 TABLET | Refills: 0 | Status: SHIPPED | OUTPATIENT
Start: 2023-08-25

## 2023-09-28 DIAGNOSIS — R44.0 AUDITORY HALLUCINATIONS: ICD-10-CM

## 2023-09-28 DIAGNOSIS — F41.9 ANXIETY: ICD-10-CM

## 2023-10-03 RX ORDER — ALPRAZOLAM 0.5 MG/1
0.5 TABLET ORAL 2 TIMES DAILY PRN
Qty: 20 TABLET | Refills: 0 | Status: SHIPPED | OUTPATIENT
Start: 2023-10-03

## 2023-10-03 RX ORDER — ARIPIPRAZOLE 5 MG/1
5 TABLET ORAL DAILY
Qty: 30 TABLET | Refills: 0 | Status: SHIPPED | OUTPATIENT
Start: 2023-10-03

## 2023-11-04 DIAGNOSIS — F99 PSYCHIATRIC SYMPTOMS: ICD-10-CM

## 2023-11-04 DIAGNOSIS — R44.0 AUDITORY HALLUCINATIONS: ICD-10-CM

## 2023-11-04 DIAGNOSIS — R07.9 CHEST PAIN: ICD-10-CM

## 2023-11-04 DIAGNOSIS — F41.9 ANXIETY: ICD-10-CM

## 2023-11-09 RX ORDER — HYDROXYZINE HYDROCHLORIDE 25 MG/1
25 TABLET, FILM COATED ORAL EVERY 6 HOURS PRN
Qty: 12 TABLET | Refills: 0 | Status: SHIPPED | OUTPATIENT
Start: 2023-11-09 | End: 2023-11-12

## 2023-11-09 RX ORDER — ALPRAZOLAM 0.5 MG/1
0.5 TABLET ORAL 2 TIMES DAILY PRN
Qty: 20 TABLET | Refills: 0 | Status: SHIPPED | OUTPATIENT
Start: 2023-11-09

## 2023-11-09 RX ORDER — FLUOXETINE 10 MG/1
30 CAPSULE ORAL DAILY
Qty: 90 CAPSULE | Refills: 0 | Status: SHIPPED | OUTPATIENT
Start: 2023-11-09 | End: 2023-12-09

## 2023-11-10 ENCOUNTER — PATIENT OUTREACH (OUTPATIENT)
Dept: FAMILY MEDICINE CLINIC | Facility: CLINIC | Age: 43
End: 2023-11-10

## 2023-11-10 NOTE — PROGRESS NOTES
ALY COTTO received consult from Provider regarding pt is having food insecurity. ALY COTTO placed call to pt to follow-up and further assess. No answer, left a details vm and ask for a return call. Will continue to follow-up.

## 2023-11-15 ENCOUNTER — PATIENT OUTREACH (OUTPATIENT)
Dept: FAMILY MEDICINE CLINIC | Facility: CLINIC | Age: 43
End: 2023-11-15

## 2023-11-15 NOTE — LETTER
[address] [address] [address] [phone]    Re: Unable to reach you    11/15/2023       Dear Maninder Thornton,    We tried to reach you by phone and was unfortunately unable to reach you. It is important that you contact the [address] [place] [address] [place] as soon as possible at: [phone].     Sincerely,         [facility]

## 2023-11-15 NOTE — PROGRESS NOTES
ALY CM placed call to pt to follow-up and further assess regarding pt is having food insecurity. No answer, left a details vm and ask for a return call. Sent UTR letter via [employer]. Will close this referral due to lack of response. Will remains available as needed.

## 2023-12-07 DIAGNOSIS — F41.9 ANXIETY: ICD-10-CM

## 2023-12-07 DIAGNOSIS — R44.0 AUDITORY HALLUCINATIONS: ICD-10-CM

## 2023-12-08 RX ORDER — ALPRAZOLAM 0.5 MG/1
0.5 TABLET ORAL 2 TIMES DAILY PRN
Qty: 20 TABLET | Refills: 0 | Status: SHIPPED | OUTPATIENT
Start: 2023-12-08

## 2023-12-08 NOTE — TELEPHONE ENCOUNTER
Refilled Alprazolam 0.5 mg # 20. Please ask patient to make a follow up appt in office before she is due for her next refill.   Thank you

## 2024-03-26 DIAGNOSIS — F99 PSYCHIATRIC SYMPTOMS: ICD-10-CM

## 2024-03-26 DIAGNOSIS — R07.9 CHEST PAIN: ICD-10-CM

## 2024-03-26 DIAGNOSIS — F41.9 ANXIETY: ICD-10-CM

## 2024-03-26 DIAGNOSIS — R44.0 AUDITORY HALLUCINATIONS: ICD-10-CM

## 2024-03-28 NOTE — TELEPHONE ENCOUNTER
Please advise patient she needs to come in for an appointment or establish care with Mental Health Office.  Her last office visit was 8/2023.

## 2024-03-29 RX ORDER — ALPRAZOLAM 0.5 MG/1
0.5 TABLET ORAL 2 TIMES DAILY PRN
Qty: 20 TABLET | Refills: 0 | Status: SHIPPED | OUTPATIENT
Start: 2024-03-29

## 2024-03-29 RX ORDER — FLUOXETINE 10 MG/1
30 CAPSULE ORAL DAILY
Qty: 90 CAPSULE | Refills: 0 | Status: SHIPPED | OUTPATIENT
Start: 2024-03-29 | End: 2024-04-28

## 2024-03-29 RX ORDER — ARIPIPRAZOLE 5 MG/1
5 TABLET ORAL DAILY
Qty: 30 TABLET | Refills: 0 | Status: SHIPPED | OUTPATIENT
Start: 2024-03-29

## 2024-03-29 RX ORDER — HYDROXYZINE HYDROCHLORIDE 25 MG/1
25 TABLET, FILM COATED ORAL EVERY 6 HOURS PRN
Qty: 12 TABLET | Refills: 0 | Status: SHIPPED | OUTPATIENT
Start: 2024-03-29 | End: 2024-04-01

## 2025-01-10 DIAGNOSIS — F99 PSYCHIATRIC SYMPTOMS: ICD-10-CM

## 2025-01-10 DIAGNOSIS — F41.9 ANXIETY: ICD-10-CM

## 2025-01-10 DIAGNOSIS — R07.9 CHEST PAIN: ICD-10-CM

## 2025-01-10 DIAGNOSIS — R44.0 AUDITORY HALLUCINATIONS: ICD-10-CM

## 2025-01-10 RX ORDER — HYDROXYZINE HYDROCHLORIDE 25 MG/1
25 TABLET, FILM COATED ORAL EVERY 6 HOURS PRN
Qty: 12 TABLET | Refills: 0 | OUTPATIENT
Start: 2025-01-10 | End: 2025-01-13

## 2025-01-10 RX ORDER — ALPRAZOLAM 0.5 MG
0.5 TABLET ORAL 2 TIMES DAILY PRN
Qty: 20 TABLET | Refills: 0 | OUTPATIENT
Start: 2025-01-10

## 2025-01-10 RX ORDER — ARIPIPRAZOLE 5 MG/1
5 TABLET ORAL DAILY
Qty: 30 TABLET | Refills: 0 | OUTPATIENT
Start: 2025-01-10

## 2025-01-14 NOTE — TELEPHONE ENCOUNTER
Called pt to make an appointment as per provider         first attempt to contact patient. left message to return my call on answering machine